# Patient Record
Sex: FEMALE | Race: WHITE | Employment: UNEMPLOYED | URBAN - METROPOLITAN AREA
[De-identification: names, ages, dates, MRNs, and addresses within clinical notes are randomized per-mention and may not be internally consistent; named-entity substitution may affect disease eponyms.]

---

## 2017-01-19 ENCOUNTER — OFFICE VISIT (OUTPATIENT)
Dept: DERMATOLOGY CLINIC | Facility: CLINIC | Age: 62
End: 2017-01-19

## 2017-01-19 DIAGNOSIS — D48.5 NEOPLASM OF UNCERTAIN BEHAVIOR OF SKIN: Primary | ICD-10-CM

## 2017-01-19 PROCEDURE — 11100 BIOPSY OF SKIN LESION: CPT | Performed by: DERMATOLOGY

## 2017-01-19 NOTE — PROGRESS NOTES
HPI:     Chief Complaint     Skin Cancer;  Lesion        HPI     Skin Cancer    Additional comments: pt here for full body exam h/o BCC last full body > one year           Lesion    Additional comments: pt here for re eval lesion right axilla       Last khadar 10/2/13    Comment 3 ascending polyps (adenomatous/sessile serrated)    COLONOSCOPY,BIOPSY  10/2/2013    Comment Procedure: COLONOSCOPY, POSSIBLE BIOPSY, POSSIBLE POLYPECTOMY 70671;  Surgeon: Dante Benito MD;  Location: [facility] biopsy– Punch biopsy is  performed see the operative note-further plan pending pathology. Patient will follow up in a week for suture removal.  All postop instructions given.   Patient will call with any interim problems or concerns      Orders Placed This

## 2017-01-19 NOTE — PROCEDURES
Procedural Report for Punch Biopsy    Procedure: With the patient is a supine position, the skin was scrubbed with alcohol. Anesthesia was obtained by injecting 2 mL of 1% Xylocaine with Epinephrine. A circular punch, 3 mm.  In size was used to incise

## 2017-01-19 NOTE — PROGRESS NOTES
Past Medical History   Diagnosis Date   • Overweight(278.02) 2/5/2011   • Glaucoma    • OTHER DISEASES 11/05     LBBB, normal echo   • Personal history of colonic polyps 10/26/2009   • Family history of colon cancer 10/26/2009   • Basal cell carcinoma 2006 M. UNCLE   • Hypertension Neg    • Thyroid disease Neg    • Thyroid Disorder Neg    • Heart Disorder Son      aortic stenosis   • Cancer Brother      throat 1 brother   • Other[other] [OTHER] Father      Alzheimer's   • Breast Cancer Paternal Aunt      70,s

## 2017-01-26 ENCOUNTER — NURSE ONLY (OUTPATIENT)
Dept: DERMATOLOGY CLINIC | Facility: CLINIC | Age: 62
End: 2017-01-26

## 2017-01-26 DIAGNOSIS — Z48.02 VISIT FOR SUTURE REMOVAL: Primary | ICD-10-CM

## 2017-01-26 PROCEDURE — 99211 OFF/OP EST MAY X REQ PHY/QHP: CPT | Performed by: DERMATOLOGY

## 2017-01-26 NOTE — PROGRESS NOTES
HPI:     Chief Complaint     Lesion        HPI     Lesion    Additional comments: pt here for suture removal s/p punch bx last week       Last edited by Ciro Sharp RN on 1/26/2017  3:34 PM. (History)         histopathology showed changes consistent with Smokeless tobacco: Never Used    Comment: quit 25 yrs ago     Alcohol Use: Yes  0.0 oz/week    0 Standard drinks or equivalent per week         Comment: 5-7dk/wk    Drug Use: No    Sexual Activity: Not on file   Not on file  Other Topics Concern    Amanda

## 2017-02-21 ENCOUNTER — OFFICE VISIT (OUTPATIENT)
Dept: DERMATOLOGY CLINIC | Facility: CLINIC | Age: 62
End: 2017-02-21

## 2017-02-21 DIAGNOSIS — D23.4 BENIGN NEOPLASM OF SCALP AND SKIN OF NECK: ICD-10-CM

## 2017-02-21 DIAGNOSIS — D23.30 BENIGN NEOPLASM OF SKIN OF FACE: ICD-10-CM

## 2017-02-21 DIAGNOSIS — L81.4 SOLAR LENTIGO: ICD-10-CM

## 2017-02-21 DIAGNOSIS — D23.5 BENIGN NEOPLASM OF SKIN OF TRUNK, EXCEPT SCROTUM: ICD-10-CM

## 2017-02-21 DIAGNOSIS — D23.70 BENIGN NEOPLASM OF SKIN OF LOWER LIMB, INCLUDING HIP, UNSPECIFIED LATERALITY: ICD-10-CM

## 2017-02-21 DIAGNOSIS — D23.60 BENIGN NEOPLASM OF SKIN OF UPPER LIMB, INCLUDING SHOULDER, UNSPECIFIED LATERALITY: ICD-10-CM

## 2017-02-21 DIAGNOSIS — Z87.2 HISTORY OF ACTINIC KERATOSES: ICD-10-CM

## 2017-02-21 DIAGNOSIS — Z85.828 PERSONAL HISTORY OF OTHER MALIGNANT NEOPLASM OF SKIN: Primary | ICD-10-CM

## 2017-02-21 DIAGNOSIS — D48.5 NEOPLASM OF UNCERTAIN BEHAVIOR OF SKIN: ICD-10-CM

## 2017-02-21 DIAGNOSIS — L71.9 ROSACEA: ICD-10-CM

## 2017-02-21 DIAGNOSIS — L82.1 SEBORRHEIC KERATOSES: ICD-10-CM

## 2017-02-21 PROCEDURE — 99212 OFFICE O/P EST SF 10 MIN: CPT | Performed by: DERMATOLOGY

## 2017-02-21 PROCEDURE — 99213 OFFICE O/P EST LOW 20 MIN: CPT | Performed by: DERMATOLOGY

## 2017-02-21 NOTE — PROGRESS NOTES
HPI:     Chief Complaint     Lesion        HPI     Lesion    Additional comments: Last visit to derm was 1/26/17 for a suture removal of benign lesion to right axilla. Pt presents today for full body skin evaluation due to hx of BCC.         Last edited b keratosis 2015     medial anterior left shoulder   • Osteoporosis      From 4/12/2016: DXA showed an L1-L4 T-score of -2.6 and a femoral neck T-score of -2.5 (0.566 gm/cm2) and total hip T-score -2.3   • Vitamin D deficiency          Past Surgical History Paternal Aunt        PHYSICAL EXAM:   Physical Exam  A complete body exam was performed, including face, neck, chest , back, abdomen, r upper extremity, l upper extremity, buttocks, genital area, l lower extremity and right lower extremity.     The patient or higher, hats, discussed  Benign neoplasm of skin of upper limb, including shoulder, unspecified laterality  Benign neoplasm of scalp and skin of neck  Benign neoplasm of skin of face  Benign neoplasm of skin of lower limb, including hip, unspecified lat

## 2017-11-11 NOTE — TELEPHONE ENCOUNTER
May rfx3- would recommend to pt that we send to [facility] to get best price

## 2017-11-13 ENCOUNTER — TELEPHONE (OUTPATIENT)
Dept: DERMATOLOGY CLINIC | Facility: CLINIC | Age: 62
End: 2017-11-13

## 2017-11-13 RX ORDER — EFINACONAZOLE 100 MG/ML
SOLUTION TOPICAL
Qty: 8 ML | Refills: 2 | Status: SHIPPED | OUTPATIENT
Start: 2017-11-13 | End: 2017-11-13

## 2017-11-13 NOTE — TELEPHONE ENCOUNTER
PT SAID [facility] IS COSTING 35 DOLLARS FOR RX NOW AND WOULD LIKE TO HAVE US TRY SENDING TO [facility]  [phone] WOULD LIKE TO SEE IF IT IS CHEAPER THERE.  PLS ADVISE

## 2017-11-13 NOTE — TELEPHONE ENCOUNTER
[facility] contacted, states that cost of Jublia Rx is $35.00 and not $10 because pt's insurance does not cover Jublia. Pt informed that it can be attempted to send Rx to [facility], will have to complete a PA.   Can see if med can be covered with

## 2018-03-13 ENCOUNTER — OFFICE VISIT (OUTPATIENT)
Dept: DERMATOLOGY CLINIC | Facility: CLINIC | Age: 63
End: 2018-03-13

## 2018-03-13 DIAGNOSIS — D23.30 BENIGN NEOPLASM OF SKIN OF FACE: ICD-10-CM

## 2018-03-13 DIAGNOSIS — D23.4 BENIGN NEOPLASM OF SCALP AND SKIN OF NECK: ICD-10-CM

## 2018-03-13 DIAGNOSIS — Z85.828 PERSONAL HISTORY OF OTHER MALIGNANT NEOPLASM OF SKIN: Primary | ICD-10-CM

## 2018-03-13 DIAGNOSIS — D23.70 BENIGN NEOPLASM OF SKIN OF LOWER LIMB, INCLUDING HIP, UNSPECIFIED LATERALITY: ICD-10-CM

## 2018-03-13 DIAGNOSIS — L57.0 LICHENOID KERATOSIS: ICD-10-CM

## 2018-03-13 DIAGNOSIS — Z87.2 HISTORY OF ACTINIC KERATOSES: ICD-10-CM

## 2018-03-13 DIAGNOSIS — D23.5 BENIGN NEOPLASM OF SKIN OF TRUNK, EXCEPT SCROTUM: ICD-10-CM

## 2018-03-13 DIAGNOSIS — L81.4 SOLAR LENTIGO: ICD-10-CM

## 2018-03-13 DIAGNOSIS — L82.1 SEBORRHEIC KERATOSES: ICD-10-CM

## 2018-03-13 DIAGNOSIS — D23.60 BENIGN NEOPLASM OF SKIN OF UPPER LIMB, INCLUDING SHOULDER, UNSPECIFIED LATERALITY: ICD-10-CM

## 2018-03-13 PROCEDURE — 99213 OFFICE O/P EST LOW 20 MIN: CPT | Performed by: DERMATOLOGY

## 2018-03-13 PROCEDURE — 99212 OFFICE O/P EST SF 10 MIN: CPT | Performed by: DERMATOLOGY

## 2018-03-13 RX ORDER — FLUOCINONIDE 0.5 MG/G
OINTMENT TOPICAL
Qty: 15 G | Refills: 2 | Status: SHIPPED | OUTPATIENT
Start: 2018-03-13 | End: 2019-12-24

## 2018-03-13 NOTE — PROGRESS NOTES
HPI:     Chief Complaint     Full Skin Exam        HPI     Full Skin Exam    Additional comments: LOV- 2-21-17. Pt presenting today for full body skin check would like brown raised lesion to lin check. Pt with personal hx of BCC.  Pt denies a family hx of history of colon cancer 10/26/2009   • Glaucoma    • Osteoporosis     From 4/12/2016: DXA showed an L1-L4 T-score of -2.6 and a femoral neck T-score of -2.5 (0.566 gm/cm2) and total hip T-score -2.3   • OTHER DISEASES 11/05    LBBB, normal echo   • Overwei Other [OTHER] Mother      Femur fracture   • Cancer Other      breast ca   • Heart Disorder Son      aortic stenosis   • Cancer Brother      throat 1 brother   • Other [OTHER] Father      Alzheimer's   • Breast Cancer Paternal Aunt      70,s   • Other [OTH Patient understands to put it on  15-20 minutes before outsides so that  it is absorbed and working and to reapply it every few hours. Benign neoplasm of skin of trunk, except scrotum-ABCDE's of melanoma discussed. the patient is to follow up yearly.   Ulices Ward

## 2018-03-13 NOTE — PROGRESS NOTES
Past Medical History:   Diagnosis Date   • Actinic keratosis 2015    medial anterior left shoulder   • Basal cell carcinoma 2006    left thigh   • Family history of colon cancer 10/26/2009   • Glaucoma    • Osteoporosis     From 4/12/2016: DXA showed an L1 quit 25yrs ago, etoh: 7-5YW/SW, illicits: no     Family History   Problem Relation Age of Onset   • Cancer Mother      colon ca, dx at age 76   • Other [OTHER] Mother      Femur fracture   • Cancer Other      breast ca   • Heart Disorder Son      aortic st

## 2019-01-24 ENCOUNTER — OFFICE VISIT (OUTPATIENT)
Dept: DERMATOLOGY CLINIC | Facility: CLINIC | Age: 64
End: 2019-01-24
Payer: COMMERCIAL

## 2019-01-24 DIAGNOSIS — H01.116 EYELID DERMATITIS, ALLERGIC/CONTACT, LEFT: Primary | ICD-10-CM

## 2019-01-24 PROCEDURE — 99212 OFFICE O/P EST SF 10 MIN: CPT | Performed by: DERMATOLOGY

## 2019-01-24 PROCEDURE — 99213 OFFICE O/P EST LOW 20 MIN: CPT | Performed by: DERMATOLOGY

## 2019-01-24 RX ORDER — ZOSTER VACCINE RECOMBINANT, ADJUVANTED 50 MCG/0.5
KIT INTRAMUSCULAR
COMMUNITY
Start: 2019-01-18 | End: 2019-05-15 | Stop reason: ALTCHOICE

## 2019-01-24 NOTE — PROGRESS NOTES
HPI:     Chief Complaint     Dermatitis        HPI     Dermatitis      Additional comments: Pt presenting with dermatitis to L lower eye lid. Pt states currently using petroleum jelly and cortisone 10. c/o itching.           Last edited by Emmett Najera, • Family history of colon cancer 10/26/2009   • Glaucoma    • Osteoporosis     From 4/12/2016: DXA showed an L1-L4 T-score of -2.6 and a femoral neck T-score of -2.5 (0.566 gm/cm2) and total hip T-score -2.3   • OTHER DISEASES 11/05    LBBB, normal echo Concern: Not Asked        Weight Concern: Not Asked        Special Diet: Not Asked        Back Care: Not Asked        Exercise: Not Asked        Bike Helmet: Not Asked        Seat Belt: Not Asked        Self-Exams: Not Asked        Grew up on a farm: Not A will let us know. Also she does wear nail polish and she is instructed to avoid touching her eyes within the first couple hours at the polish is applied. No orders of the defined types were placed in this encounter.       Results From Past 48 Hours:  No

## 2019-01-25 ENCOUNTER — TELEPHONE (OUTPATIENT)
Dept: DERMATOLOGY CLINIC | Facility: CLINIC | Age: 64
End: 2019-01-25

## 2019-01-25 NOTE — TELEPHONE ENCOUNTER
tobramycin-dexamethasone 0.3-0.1 % Ophthalmic Ointment is $230. Is there another medication that can be prescribed? Please advise. Thank you.

## 2019-04-16 ENCOUNTER — OFFICE VISIT (OUTPATIENT)
Dept: DERMATOLOGY CLINIC | Facility: CLINIC | Age: 64
End: 2019-04-16
Payer: COMMERCIAL

## 2019-04-16 DIAGNOSIS — Z85.828 PERSONAL HISTORY OF SKIN CANCER: ICD-10-CM

## 2019-04-16 DIAGNOSIS — D23.5 BENIGN NEOPLASM OF SKIN OF TRUNK, EXCEPT SCROTUM: ICD-10-CM

## 2019-04-16 DIAGNOSIS — L57.0 LICHENOID KERATOSIS: ICD-10-CM

## 2019-04-16 DIAGNOSIS — L82.1 SEBORRHEIC KERATOSES: ICD-10-CM

## 2019-04-16 DIAGNOSIS — L57.0 ACTINIC KERATOSIS: Primary | ICD-10-CM

## 2019-04-16 DIAGNOSIS — D23.60 BENIGN NEOPLASM OF SKIN OF UPPER EXTREMITY AND SHOULDER, UNSPECIFIED LATERALITY: ICD-10-CM

## 2019-04-16 DIAGNOSIS — D23.4 BENIGN NEOPLASM OF SCALP AND SKIN OF NECK: ICD-10-CM

## 2019-04-16 DIAGNOSIS — D23.30 BENIGN NEOPLASM OF SKIN OF FACE: ICD-10-CM

## 2019-04-16 DIAGNOSIS — H01.116 EYELID DERMATITIS, ALLERGIC/CONTACT, LEFT: ICD-10-CM

## 2019-04-16 DIAGNOSIS — L81.4 SOLAR LENTIGO: ICD-10-CM

## 2019-04-16 DIAGNOSIS — D23.70 BENIGN NEOPLASM OF SKIN OF LOWER EXTREMITY, INCLUDING HIP, UNSPECIFIED LATERALITY: ICD-10-CM

## 2019-04-16 PROCEDURE — 17000 DESTRUCT PREMALG LESION: CPT | Performed by: DERMATOLOGY

## 2019-04-16 PROCEDURE — 99213 OFFICE O/P EST LOW 20 MIN: CPT | Performed by: DERMATOLOGY

## 2019-04-16 PROCEDURE — 17003 DESTRUCT PREMALG LES 2-14: CPT | Performed by: DERMATOLOGY

## 2019-04-16 NOTE — PROGRESS NOTES
Past Medical History:   Diagnosis Date   • Actinic keratosis 2015    medial anterior left shoulder   • Basal cell carcinoma 2006    left thigh   • Family history of colon cancer 10/26/2009   • Glaucoma    • Osteoporosis     From 4/12/2016: DXA showed an L1 Relationships      Social connections:        Talks on phone: Not on file        Gets together: Not on file        Attends Denominational service: Not on file        Active member of club or organization: Not on file        Attends meetings of clubs or [place] RASH
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
## 2019-05-15 PROCEDURE — 88175 CYTOPATH C/V AUTO FLUID REDO: CPT | Performed by: INTERNAL MEDICINE

## 2019-05-15 PROCEDURE — 87624 HPV HI-RISK TYP POOLED RSLT: CPT | Performed by: INTERNAL MEDICINE

## 2020-06-02 ENCOUNTER — OFFICE VISIT (OUTPATIENT)
Dept: DERMATOLOGY CLINIC | Facility: CLINIC | Age: 65
End: 2020-06-02
Payer: COMMERCIAL

## 2020-06-02 DIAGNOSIS — L81.4 SOLAR LENTIGO: ICD-10-CM

## 2020-06-02 DIAGNOSIS — D23.5 BENIGN NEOPLASM OF SKIN OF TRUNK, EXCEPT SCROTUM: ICD-10-CM

## 2020-06-02 DIAGNOSIS — L82.1 SEBORRHEIC KERATOSES: ICD-10-CM

## 2020-06-02 DIAGNOSIS — D23.70 BENIGN NEOPLASM OF SKIN OF LOWER EXTREMITY, INCLUDING HIP, UNSPECIFIED LATERALITY: ICD-10-CM

## 2020-06-02 DIAGNOSIS — D23.4 BENIGN NEOPLASM OF SCALP AND SKIN OF NECK: ICD-10-CM

## 2020-06-02 DIAGNOSIS — D23.60 BENIGN NEOPLASM OF SKIN OF UPPER EXTREMITY AND SHOULDER, UNSPECIFIED LATERALITY: ICD-10-CM

## 2020-06-02 DIAGNOSIS — Z85.828 PERSONAL HISTORY OF SKIN CANCER: Primary | ICD-10-CM

## 2020-06-02 DIAGNOSIS — D23.30 BENIGN NEOPLASM OF SKIN OF FACE: ICD-10-CM

## 2020-06-02 DIAGNOSIS — L71.9 ROSACEA: ICD-10-CM

## 2020-06-02 PROCEDURE — 99213 OFFICE O/P EST LOW 20 MIN: CPT | Performed by: DERMATOLOGY

## 2020-06-02 PROCEDURE — 99212 OFFICE O/P EST SF 10 MIN: CPT | Performed by: DERMATOLOGY

## 2020-06-02 NOTE — PROGRESS NOTES
HPI:     Chief Complaint     Full Skin Exam        HPI     Full Skin Exam      Additional comments: LOV 4/16/2019 - Pt presenting for full body skin exam.  Pt has personal hx of AK's and BCC.           Last edited by Josy Celeste RN on 6/2/2020 10:40 A COLONOSCOPY, POSSIBLE BIOPSY, POSSIBLE POLYPECTOMY 87473 N/A 10/2/2013    Performed by Chapito Hazel MD at [address] [place] [address]  3/3/17    normal   •       x3   • OTHER SURGICAL HISTORY      closed angle glaucoma co Hobby Hazards: Not Asked        Sleep Concern: Not Asked        Stress Concern: Not Asked        Weight Concern: Not Asked        Special Diet: Not Asked        Back Care: Not Asked        Exercise: Not Asked        Bike Helmet: Not Asked        Seat Belt: cherry red papules  - there are numerous tan and brown stuck on keratoses. ASSESSMENT/PLAN:   Personal history of skin cancer  (primary encounter diagnosis)-no evidence of recurrent or new suspicious lesions.   Would recommend monthly self exams and

## 2021-05-20 ENCOUNTER — OFFICE VISIT (OUTPATIENT)
Dept: DERMATOLOGY CLINIC | Facility: CLINIC | Age: 66
End: 2021-05-20
Payer: COMMERCIAL

## 2021-05-20 DIAGNOSIS — L82.0 INFLAMED SEBORRHEIC KERATOSIS: ICD-10-CM

## 2021-05-20 DIAGNOSIS — D22.9 MULTIPLE MELANOCYTIC NEVI: ICD-10-CM

## 2021-05-20 DIAGNOSIS — L81.4 SOLAR LENTIGO: ICD-10-CM

## 2021-05-20 DIAGNOSIS — D18.01 HEMANGIOMA OF SKIN AND SUBCUTANEOUS TISSUE: ICD-10-CM

## 2021-05-20 DIAGNOSIS — L82.1 SEBORRHEIC KERATOSES: ICD-10-CM

## 2021-05-20 DIAGNOSIS — Z85.828 PERSONAL HISTORY OF SKIN CANCER: Primary | ICD-10-CM

## 2021-05-20 DIAGNOSIS — L57.8 SUN-DAMAGED SKIN: ICD-10-CM

## 2021-05-20 DIAGNOSIS — L71.9 ROSACEA: ICD-10-CM

## 2021-05-20 PROCEDURE — 99213 OFFICE O/P EST LOW 20 MIN: CPT | Performed by: DERMATOLOGY

## 2021-05-20 PROCEDURE — 17110 DESTRUCTION B9 LES UP TO 14: CPT | Performed by: DERMATOLOGY

## 2021-05-20 NOTE — PROGRESS NOTES
HPI:     Chief Complaint     Full Skin Exam        HPI     Full Skin Exam      Additional comments: LOV 6/2/20. pt presenting today with full body skin check.  pt has HX of AK's,BCC          Last edited by ROLAN Holman on 5/20/2021  1:36 PM. (Histo neck T-score of -2.5 (0.566 gm/cm2) and total hip T-score -2.3   • OTHER DISEASES 11/05    LBBB, normal echo   • Overweight(278.02) 2/5/2011   • Personal history of colonic polyps 10/26/2009   • Vitamin D deficiency      Past Surgical History:   Procedure Asked        Self-Exams: Not Asked        Grew up on a farm: Not Asked        History of tanning: Not Asked        Outdoor occupation: Not Asked        Pt has a pacemaker: No        Pt has a defibrillator: No        Breast feeding: Not Asked        Reactio extremity, buttocks, genital area, l lower extremity and right lower extremity, and scalp    The patient is alert, oriented, and appears their stated age.   Patient is well nourished and in no distress    The exam was remarkable for the following:  - there Referral Orders:  No orders of the defined types were placed in this encounter.         5/20/2021  Compa Maciel

## 2022-06-18 ENCOUNTER — OFFICE VISIT (OUTPATIENT)
Dept: DERMATOLOGY CLINIC | Facility: CLINIC | Age: 67
End: 2022-06-18
Payer: MEDICARE

## 2022-06-18 DIAGNOSIS — L57.0 ACTINIC KERATOSIS: ICD-10-CM

## 2022-06-18 DIAGNOSIS — L71.9 ROSACEA: ICD-10-CM

## 2022-06-18 DIAGNOSIS — D23.9 BENIGN NEOPLASM OF SKIN, UNSPECIFIED LOCATION: ICD-10-CM

## 2022-06-18 DIAGNOSIS — Z85.828 HISTORY OF NONMELANOMA SKIN CANCER: ICD-10-CM

## 2022-06-18 DIAGNOSIS — L82.1 SEBORRHEIC KERATOSES: ICD-10-CM

## 2022-06-18 DIAGNOSIS — L81.4 SOLAR LENTIGO: ICD-10-CM

## 2022-06-18 DIAGNOSIS — L57.8 SUN-DAMAGED SKIN: ICD-10-CM

## 2022-06-18 DIAGNOSIS — D22.9 MULTIPLE MELANOCYTIC NEVI: Primary | ICD-10-CM

## 2022-06-18 PROCEDURE — 17000 DESTRUCT PREMALG LESION: CPT | Performed by: DERMATOLOGY

## 2022-06-18 PROCEDURE — 17003 DESTRUCT PREMALG LES 2-14: CPT | Performed by: DERMATOLOGY

## 2022-06-18 PROCEDURE — 99213 OFFICE O/P EST LOW 20 MIN: CPT | Performed by: DERMATOLOGY

## 2022-06-18 RX ORDER — TAVABOROLE TOPICAL SOLUTION, 5% 43.5 MG/ML
SOLUTION TOPICAL
Qty: 10 ML | Refills: 2 | Status: SHIPPED | OUTPATIENT
Start: 2022-06-18

## 2022-06-18 RX ORDER — TAVABOROLE TOPICAL SOLUTION, 5% 43.5 MG/ML
SOLUTION TOPICAL
Qty: 10 ML | Refills: 2 | Status: SHIPPED | OUTPATIENT
Start: 2022-06-18 | End: 2022-06-18

## 2022-06-20 ENCOUNTER — TELEPHONE (OUTPATIENT)
Dept: DERMATOLOGY CLINIC | Facility: CLINIC | Age: 67
End: 2022-06-20

## 2022-06-21 NOTE — TELEPHONE ENCOUNTER
No pa--already discussed with pt --sent to [facility] already. Please tell [facility] rx to be filled else where and disregard and messages from them regarding this.

## 2023-05-08 ENCOUNTER — OFFICE VISIT (OUTPATIENT)
Dept: DERMATOLOGY CLINIC | Facility: CLINIC | Age: 68
End: 2023-05-08

## 2023-05-08 DIAGNOSIS — H01.131 ECZEMATOUS DERMATITIS OF UPPER EYELIDS OF BOTH EYES: Primary | ICD-10-CM

## 2023-05-08 DIAGNOSIS — H01.134 ECZEMATOUS DERMATITIS OF UPPER EYELIDS OF BOTH EYES: Primary | ICD-10-CM

## 2023-10-31 NOTE — PROGRESS NOTES
Patient called stating she spoke with RN earlier today and was told refill would be sent in for her eyelid dermatitis. Leaving for trip tomorrow. Script refilled.

## 2023-10-31 NOTE — TELEPHONE ENCOUNTER
HC last sent 5/8/23 ok to refill?     1. Eczematous dermatitis of upper eyelids of both eyes  -After discussion with patient, advised the following:  -Start hydrocortisone    LOV 5/8/23

## 2023-11-02 RX ORDER — TACROLIMUS 1 MG/G
1 OINTMENT TOPICAL 2 TIMES DAILY
Qty: 60 G | Refills: 3 | Status: SHIPPED | OUTPATIENT
Start: 2023-11-02

## 2023-11-02 NOTE — TELEPHONE ENCOUNTER
If patient is still using this medication then I would like her to start using protopic as that would be a better option for her. Steroid creams used chronically around the eyes will cause thinning skin, hypopigmentation and glaucoma. I will send protopic to the pharmacy.

## (undated) NOTE — MR AVS SNAPSHOT
[facility] [place] [address] [address] [id number] [phone]               Thank you for choosing us for your health care visit with Luc Gallegos MD.  We are glad to serve you and happy to provide you with this summary of y Allergies as of Feb 21, 2017     Sulfa Drugs Cross Reactors     Sulfanilamide Rash                   Current Medications          This list is accurate as of: 2/21/17  4:53 PM.  Always use your most recent med list.                Efinaconazole 10 % Soln

## (undated) NOTE — Clinical Note
1/24/2017              [facility] [address] [place] [address] [place]        [place] [id number]         Dear Dana Dickinsno,      The report of the Biopsy done on 7/95/19 shows a lichenoid keratosis. This is a benign (not cancerous) growth, and requires no further treatment.

## (undated) NOTE — MR AVS SNAPSHOT
[facility] SPECIALTY [facility] - [place] [address] [address]  [id number] [phone]               Thank you for choosing us for your health care visit with Caroline Harris MD.  We are glad to serve you and happy to provide you with this summary of y metRONIDAZOLE 0.75 % Crea   Apply 1 Application topically daily.    Commonly known as:  [address] [place] [address]                   MyChart                  Visit [facility] online at  [email or web address]

## (undated) NOTE — MR AVS SNAPSHOT
[facility] SPECIALTY [facility] - [place] [address] [address]  [id number] [phone]               Thank you for choosing us for your health care visit with Yudi Howard MD.  We are glad to serve you and happy to provide you with this summary of y Commonly known as:  METROCREAM                   Follow-up Instructions     Return in about 1 week (around 1/26/2017).          Luisharsaad               Educational Information     Healthy Diet and Regular Exercise  [employer] of [address] [place] [address]